# Patient Record
Sex: FEMALE | Race: WHITE
[De-identification: names, ages, dates, MRNs, and addresses within clinical notes are randomized per-mention and may not be internally consistent; named-entity substitution may affect disease eponyms.]

---

## 2018-05-24 LAB
ANION GAP SERPL CALC-SCNC: 14 MMOL/L (ref 5–19)
BUN SERPL-MCNC: 14 MG/DL (ref 7–20)
CALCIUM: 9.9 MG/DL (ref 8.4–10.2)
CHLORIDE SERPL-SCNC: 109 MMOL/L (ref 98–107)
CO2 SERPL-SCNC: 24 MMOL/L (ref 22–30)
ERYTHROCYTE [DISTWIDTH] IN BLOOD BY AUTOMATED COUNT: 13.2 % (ref 11.5–14)
GLUCOSE SERPL-MCNC: 84 MG/DL (ref 75–110)
HCT VFR BLD CALC: 42.4 % (ref 36–47)
HGB BLD-MCNC: 14.2 G/DL (ref 12–15.5)
MCH RBC QN AUTO: 32.1 PG (ref 27–33.4)
MCHC RBC AUTO-ENTMCNC: 33.4 G/DL (ref 32–36)
MCV RBC AUTO: 96 FL (ref 80–97)
PLATELET # BLD: 227 10^3/UL (ref 150–450)
POTASSIUM SERPL-SCNC: 4.7 MMOL/L (ref 3.6–5)
RBC # BLD AUTO: 4.41 10^6/UL (ref 3.72–5.28)
SODIUM SERPL-SCNC: 146.7 MMOL/L (ref 137–145)
WBC # BLD AUTO: 4.4 10^3/UL (ref 4–10.5)

## 2018-05-30 ENCOUNTER — HOSPITAL ENCOUNTER (OUTPATIENT)
Dept: HOSPITAL 62 - OROUT | Age: 33
Discharge: HOME | End: 2018-05-30
Attending: SURGERY
Payer: MEDICAID

## 2018-05-30 VITALS — DIASTOLIC BLOOD PRESSURE: 62 MMHG | SYSTOLIC BLOOD PRESSURE: 108 MMHG

## 2018-05-30 DIAGNOSIS — Z79.899: ICD-10-CM

## 2018-05-30 DIAGNOSIS — Z88.0: ICD-10-CM

## 2018-05-30 DIAGNOSIS — G47.00: ICD-10-CM

## 2018-05-30 DIAGNOSIS — R00.2: ICD-10-CM

## 2018-05-30 DIAGNOSIS — F17.210: ICD-10-CM

## 2018-05-30 DIAGNOSIS — K42.0: Primary | ICD-10-CM

## 2018-05-30 DIAGNOSIS — F41.0: ICD-10-CM

## 2018-05-30 DIAGNOSIS — F41.9: ICD-10-CM

## 2018-05-30 PROCEDURE — 81025 URINE PREGNANCY TEST: CPT

## 2018-05-30 PROCEDURE — 80048 BASIC METABOLIC PNL TOTAL CA: CPT

## 2018-05-30 PROCEDURE — C9290 INJ, BUPIVACAINE LIPOSOME: HCPCS

## 2018-05-30 PROCEDURE — 93005 ELECTROCARDIOGRAM TRACING: CPT

## 2018-05-30 PROCEDURE — 93010 ELECTROCARDIOGRAM REPORT: CPT

## 2018-05-30 PROCEDURE — 85027 COMPLETE CBC AUTOMATED: CPT

## 2018-05-30 PROCEDURE — 36415 COLL VENOUS BLD VENIPUNCTURE: CPT

## 2018-05-30 PROCEDURE — 49587: CPT

## 2018-05-30 NOTE — DISCHARGE SUMMARY
Discharge Summary (SDC)





- Discharge


Final Diagnosis: 





preperitoneal hernia


Date of Surgery: 05/30/18


Discharge Date: 05/30/18


Condition: Stable


Treatment or Instructions: 








 [facility]


 [address] [place] [id number]


 Phone: [phone]    Fax:  [phone]


 





 Discharge Instructions: Laparoscopic Surgery





1. General Information: 


a.   DO NOT DRIVE a car or operate dangerous machinery for 3-4 days.


b.   DO NOT consume alcohol, tranquilizers, sleeping medications or any non-

prescribed medications for 24 hours unless approved by your doctor or as long 

as taking narcotic prescription medications.


c.   DO NOT make important decisions or sign any important papers for the first 

24 hours after surgery.


d.   When discharged home the same day of surgery have a responsible person 

with you for the first night.


2. Activity Restrictions: __8 weeks.


a.   NO heavy lifting, straining abdominal muscles, bending over a lot, yard 

work, house work, or sports for 2 weeks.


b.   DO NOT drive for 3-4 days  


c.   It is fine to go for walks, up and down steps, ride in a car. 


d.   Elevate your head when sleeping/resting.


3. Treatment: 


a.   You may shower 48 hours after surgery, no baths or swimming for 2 weeks. 

Remove band-aids or dressings before shower but leave paper strips (steri-strips

) on the skin to fall off on their own. If still on at postoperative visit they 

will be removed then.  


b.   Drainage of fluid or blood is not unusual from an incision. If occurs, you 

can clean with peroxide and cotton ball daily and cover with dry gauze until 

the wound seals. 


c.   If a lot of bleeding occurs, you can hold pressure with a gauze or cloth 

over the site for 10 minutes and it will usually stop. If bleeding continues 

you will need to call for possible evaluation in office or emergency room.


4. Medications: 


a.   ___Toradol__ may be taken for pain as needed, one tablet every 6 hours. 


b.   You should resume all normal medications unless a change is specified by 

your doctors.





5. Diet: 


                _____ Begin with clear liquids and may progress to your normal 

diet if not nauseated. No high fat, high protein foods 


                         the day of surgery. 


   


6. The following may occur after laparoscopic surgery:


a.   Shoulder or upper back ache from retained gas that should resolve in 1-2 

days


b.   Soreness and bruising at incision sites will resolve with time.


c.   Scrotal swelling (labia in women) and bruising is often seen after hernia 

surgery.


d.   Sore throat


e.   Fatigue may last days to weeks.


f.   Difficulty urinating may occur and may need to come into emergency room 

for urinary catheter placement.


7. Notify Physician If:


a.   Worsening or pain not improved with pain medication


b.   Persistent nausea and vomiting


c.   Fever above 101


d.   Persistent bleeding or swelling at operative site


e.   Unable to urinate and uncomfortable bladder 6-8 hours after surgery


8..Follow Up Care:


a.   Schedule a follow up appointment with your doctor for 2 weeks. In the 

event of any postoperative problems or questions or you may call the office 

during business hours or the On-Call physician evenings and weekends at [facility].   [facility] [phone]     [facility] [phone]





   I understand the instructions for my postoperative care as described above 

and a copy has been given to me.





   _________________________          _________________________          _______

________


   Patient/Significant Other                    Witness                        

                       Date


Prescriptions: 


Ketorolac Tromethamine [Toradol 10 mg Tablet] 10 mg PO Q6HP PRN #20 tablet


 PRN Reason: 


Referrals: 


SEYMOUR CHRISTENSEN MD [Primary Care Provider] - 


Discharge Diet: As Tolerated


Discharge Activity: No Lifting Over 10 Pounds, No Lifting/Push/Pulling, Walk 

Frequently


Report the Following to Your Physician Immediately: Nausea, Vomiting, Increase 

in Pain, Fever over 101 Degrees, Unusual Bleeding, Redness, Swelling, Warmth, 

Drainage-Foul Smelling

## 2018-05-30 NOTE — OPERATIVE REPORT
Operative Report


DATE OF SURGERY: 05/30/18


PREOPERATIVE DIAGNOSIS: Umbilical hernia with incarceration


POSTOPERATIVE DIAGNOSIS: Same


OPERATION: Open umbilical hernia with primary closure


SURGEON: SAMSON LEE ASSISTANT: DINORA SHARIF


ANESTHESIA: GA


TISSUE REMOVED OR ALTERED: Pre-peritoneal fat


COMPLICATIONS: 





None


ESTIMATED BLOOD LOSS: Scant


INTRAOPERATIVE FINDINGS: See below


PROCEDURE: 





Patient was taken to the preop holding area the main operating room where 

general anesthesia was induced.  Abdomen was exposed, prepped and draped in 

sterile fashion.  Surgical plan surgical timeout were discussed.





Findings on physical exam revealed a umbilical hernia with prolapsed fatty 

tissue on the cephalad side of the like us.  Skin was any status quarter 

percent Marcaine, and a curvilinear incision was made on the inside of the 

bellybutton from the 9:00 to the 3 o'clock position.  The underlying 

subcutaneous tissue was elevated, and the hernia sac dissected from surrounding 

tissue all the way to its point of origin.  The umbilicus was elevated off of 

the leg.  The findings are significant for what was most consistent with 

preperitoneal fat herniating through a very small fascial defect.  The 

preperitoneal fat was amputated at its base and the small stalk oversewed with 

a 2-0 Vicryl suture.  The small pedicle was allowed to retract into the retro-

fascial tissue.  I used a hemostat to slightly undermine the subfascial tissue 

and then appreciated the true size of the fascial defect which was 

approximately 1/2 cm in length.  I felt the primary closure with a single figure

-of-eight 0 PDS suture would appropriately of the defect and so this was 

affected with securing the knot.  The umbilicus was constructed with a 3-0 

Vicryl suture, benzoin Steri-Strips and a bulky dressing.





Patient taught procedure well, extubated taken recovery in stable condition.





The physician assistant, Ms. Sidhu, provided assistance during this case by: 

Assisting  with retracting tissue, instillation of local anesthesia and closure 

of skin incisions.